# Patient Record
Sex: MALE | Race: WHITE | NOT HISPANIC OR LATINO
[De-identification: names, ages, dates, MRNs, and addresses within clinical notes are randomized per-mention and may not be internally consistent; named-entity substitution may affect disease eponyms.]

---

## 2017-03-15 ENCOUNTER — APPOINTMENT (OUTPATIENT)
Dept: CARDIOLOGY | Facility: CLINIC | Age: 72
End: 2017-03-15

## 2017-03-27 ENCOUNTER — APPOINTMENT (OUTPATIENT)
Dept: CARDIOLOGY | Facility: CLINIC | Age: 72
End: 2017-03-27

## 2017-04-06 ENCOUNTER — APPOINTMENT (OUTPATIENT)
Dept: CARDIOLOGY | Facility: CLINIC | Age: 72
End: 2017-04-06

## 2017-06-12 ENCOUNTER — APPOINTMENT (OUTPATIENT)
Dept: CARDIOLOGY | Facility: CLINIC | Age: 72
End: 2017-06-12

## 2017-07-18 ENCOUNTER — APPOINTMENT (OUTPATIENT)
Dept: CARDIOLOGY | Facility: CLINIC | Age: 72
End: 2017-07-18

## 2017-07-20 ENCOUNTER — APPOINTMENT (OUTPATIENT)
Dept: CARDIOLOGY | Facility: CLINIC | Age: 72
End: 2017-07-20

## 2017-07-24 ENCOUNTER — APPOINTMENT (OUTPATIENT)
Dept: CARDIOLOGY | Facility: CLINIC | Age: 72
End: 2017-07-24

## 2017-09-05 ENCOUNTER — APPOINTMENT (OUTPATIENT)
Dept: CARDIOLOGY | Facility: CLINIC | Age: 72
End: 2017-09-05
Payer: MEDICARE

## 2017-09-05 PROCEDURE — 99214 OFFICE O/P EST MOD 30 MIN: CPT

## 2017-11-21 ENCOUNTER — RX RENEWAL (OUTPATIENT)
Age: 72
End: 2017-11-21

## 2017-11-22 DIAGNOSIS — I07.1 RHEUMATIC TRICUSPID INSUFFICIENCY: ICD-10-CM

## 2017-11-28 ENCOUNTER — RX RENEWAL (OUTPATIENT)
Age: 72
End: 2017-11-28

## 2018-02-28 ENCOUNTER — RECORD ABSTRACTING (OUTPATIENT)
Age: 73
End: 2018-02-28

## 2018-02-28 DIAGNOSIS — I47.2 VENTRICULAR TACHYCARDIA: ICD-10-CM

## 2018-02-28 DIAGNOSIS — Q21.1 ATRIAL SEPTAL DEFECT: ICD-10-CM

## 2018-02-28 DIAGNOSIS — Z98.890 OTHER SPECIFIED POSTPROCEDURAL STATES: ICD-10-CM

## 2018-02-28 DIAGNOSIS — I49.1 ATRIAL PREMATURE DEPOLARIZATION: ICD-10-CM

## 2018-02-28 DIAGNOSIS — Z80.9 FAMILY HISTORY OF MALIGNANT NEOPLASM, UNSPECIFIED: ICD-10-CM

## 2018-02-28 DIAGNOSIS — Z86.11 PERSONAL HISTORY OF TUBERCULOSIS: ICD-10-CM

## 2018-02-28 DIAGNOSIS — Z86.73 PERSONAL HISTORY OF TRANSIENT ISCHEMIC ATTACK (TIA), AND CEREBRAL INFARCTION W/OUT RESIDUAL DEFICITS: ICD-10-CM

## 2018-03-06 ENCOUNTER — APPOINTMENT (OUTPATIENT)
Dept: CARDIOLOGY | Facility: CLINIC | Age: 73
End: 2018-03-06

## 2018-08-28 ENCOUNTER — APPOINTMENT (OUTPATIENT)
Dept: CARDIOLOGY | Facility: CLINIC | Age: 73
End: 2018-08-28
Payer: MEDICARE

## 2018-08-28 ENCOUNTER — NON-APPOINTMENT (OUTPATIENT)
Age: 73
End: 2018-08-28

## 2018-08-28 VITALS
WEIGHT: 164 LBS | BODY MASS INDEX: 24.86 KG/M2 | SYSTOLIC BLOOD PRESSURE: 98 MMHG | HEART RATE: 73 BPM | DIASTOLIC BLOOD PRESSURE: 64 MMHG | HEIGHT: 68 IN

## 2018-08-28 PROCEDURE — 93000 ELECTROCARDIOGRAM COMPLETE: CPT

## 2018-08-28 PROCEDURE — 99214 OFFICE O/P EST MOD 30 MIN: CPT

## 2018-08-28 RX ORDER — FINASTERIDE 5 MG/1
5 TABLET, FILM COATED ORAL DAILY
Refills: 0 | Status: ACTIVE | COMMUNITY

## 2018-08-28 RX ORDER — TAMSULOSIN HYDROCHLORIDE 0.4 MG/1
0.4 CAPSULE ORAL DAILY
Refills: 0 | Status: ACTIVE | COMMUNITY

## 2018-08-28 RX ORDER — ASPIRIN 81 MG
81 TABLET, DELAYED RELEASE (ENTERIC COATED) ORAL DAILY
Refills: 0 | Status: ACTIVE | COMMUNITY

## 2018-08-28 RX ORDER — TADALAFIL 5 MG/1
5 TABLET, FILM COATED ORAL
Refills: 0 | Status: ACTIVE | COMMUNITY

## 2018-10-11 ENCOUNTER — APPOINTMENT (OUTPATIENT)
Dept: CARDIOLOGY | Facility: CLINIC | Age: 73
End: 2018-10-11
Payer: MEDICARE

## 2018-10-11 PROCEDURE — 93306 TTE W/DOPPLER COMPLETE: CPT

## 2018-10-23 ENCOUNTER — APPOINTMENT (OUTPATIENT)
Dept: CARDIOLOGY | Facility: CLINIC | Age: 73
End: 2018-10-23
Payer: MEDICARE

## 2018-10-23 VITALS
SYSTOLIC BLOOD PRESSURE: 104 MMHG | HEART RATE: 80 BPM | DIASTOLIC BLOOD PRESSURE: 70 MMHG | BODY MASS INDEX: 24.25 KG/M2 | HEIGHT: 68 IN | WEIGHT: 160 LBS

## 2018-10-23 DIAGNOSIS — R06.09 OTHER FORMS OF DYSPNEA: ICD-10-CM

## 2018-10-23 PROCEDURE — 99214 OFFICE O/P EST MOD 30 MIN: CPT

## 2019-04-30 ENCOUNTER — RX RENEWAL (OUTPATIENT)
Age: 74
End: 2019-04-30

## 2019-08-06 ENCOUNTER — APPOINTMENT (OUTPATIENT)
Dept: CARDIOLOGY | Facility: CLINIC | Age: 74
End: 2019-08-06
Payer: MEDICARE

## 2019-08-06 ENCOUNTER — NON-APPOINTMENT (OUTPATIENT)
Age: 74
End: 2019-08-06

## 2019-08-06 VITALS
BODY MASS INDEX: 23.7 KG/M2 | HEIGHT: 69 IN | SYSTOLIC BLOOD PRESSURE: 110 MMHG | HEART RATE: 76 BPM | WEIGHT: 160 LBS | DIASTOLIC BLOOD PRESSURE: 60 MMHG | OXYGEN SATURATION: 98 %

## 2019-08-06 PROCEDURE — 93000 ELECTROCARDIOGRAM COMPLETE: CPT

## 2019-08-06 PROCEDURE — 99214 OFFICE O/P EST MOD 30 MIN: CPT

## 2019-08-06 NOTE — PHYSICAL EXAM
[General Appearance - Well Developed] : well developed [Normal Appearance] : normal appearance [General Appearance - Well Nourished] : well nourished [Well Groomed] : well groomed [General Appearance - In No Acute Distress] : no acute distress [No Deformities] : no deformities [Eyelids - No Xanthelasma] : the eyelids demonstrated no xanthelasmas [Normal Conjunctiva] : the conjunctiva exhibited no abnormalities [No Oral Pallor] : no oral pallor [Normal Oral Mucosa] : normal oral mucosa [No Oral Cyanosis] : no oral cyanosis [Normal Jugular Venous A Waves Present] : normal jugular venous A waves present [Normal Jugular Venous V Waves Present] : normal jugular venous V waves present [No Jugular Venous Crowder A Waves] : no jugular venous crowder A waves [Respiration, Rhythm And Depth] : normal respiratory rhythm and effort [Exaggerated Use Of Accessory Muscles For Inspiration] : no accessory muscle use [Heart Rate And Rhythm] : heart rate and rhythm were normal [Auscultation Breath Sounds / Voice Sounds] : lungs were clear to auscultation bilaterally [Murmurs] : no murmurs present [Heart Sounds] : normal S1 and S2 [Abdomen Soft] : soft [Abdomen Tenderness] : non-tender [Abdomen Mass (___ Cm)] : no abdominal mass palpated [Abnormal Walk] : normal gait [Gait - Sufficient For Exercise Testing] : the gait was sufficient for exercise testing [Cyanosis, Localized] : no localized cyanosis [Nail Clubbing] : no clubbing of the fingernails [Skin Color & Pigmentation] : normal skin color and pigmentation [Petechial Hemorrhages (___cm)] : no petechial hemorrhages [] : no rash [Skin Lesions] : no skin lesions [No Skin Ulcers] : no skin ulcer [No Venous Stasis] : no venous stasis [Oriented To Time, Place, And Person] : oriented to person, place, and time [No Xanthoma] : no  xanthoma was observed [Mood] : the mood was normal [Affect] : the affect was normal [No Anxiety] : not feeling anxious

## 2019-08-06 NOTE — REASON FOR VISIT
[Follow-Up - Clinic] : a clinic follow-up of [Abnormal ECG] : an abnormal ECG [Anticoagulation] : anticoagulation [Atrial Fibrillation] : atrial fibrillation [Dyspnea] : dyspnea [Cardiomyopathy] : cardiomyopathy [Medication Management] : Medication management [Hyperlipidemia] : hyperlipidemia

## 2019-08-08 NOTE — ASSESSMENT
[FreeTextEntry1] : \par PAF multiple DCCV s/p multaq and rfa, clinical improvement\par Cardiomyopathy\par Normal coronaries by cath '14\par PAD\par TIA, PFO on NOAC \par Left bundle-branch block.\par NSVT\par Baseline low blood pressure. \par Moderate valvular heart disease. \par \par Monitor valve disease and left ventricular function. \par Blood work has been requested. \par \par Continue anticoagulation with Eliquis.   Bleeding precautions reviewed. Monitor hemoglobin and renal function.\par Continue statin\par \par Reviewed red flag symptoms such as sustained palpitations and syncope, which would warrant urgent re-evaluation. \par \par \par

## 2019-08-08 NOTE — HISTORY OF PRESENT ILLNESS
[FreeTextEntry1] : ISSAC QUINONEZ  is a 74 year old  M\par \par with history of recurrent paroxysmal atrial fibrillation (fails corvert) s/p multiple DCCV, Multaq then RFA, NSVT, LBBB, normal coronaries, mildly reduced LVEF without sx CHF, VHD, remote TIA, PFO, tuberculosis status post treatment, borderline hypertension, minor atherosclerosis, basline low BP, PAD  and dyslipidemia.\par Prior open repair of an iliac aneurysm.\par Prior diagnosis of AF.  Initially on Coumadin due to Rifampin (h/o Tb).   Changed from Coumadin to Eliquis. \par There is no prior history of a clinical myocardial infarction, coronary revascularization. \par There is no history of symptomatic congestive heart failure \par \par Increased episodes of atrial fibrillation despite treatment with antiarrhythmic therapy. \par Underwent radiofrequency ablation.  \par Cardiac MRI performed at [facility]. No evidence of intrinsic myocardial disease.\par \par There is no exertional chest pain, pressure or discomfort. \par There is no significant dyspnea on exertion or orthopnea. \par There are no symptomatic palpitations, lightheadedness, dizziness or syncope.\par \par Blood work, hemoglobin 14.0, creatinine 0.8, LDL 90, total cholesterol 160 BNP 32 TSH 1.7. \par Last echocardiogram demonstrated ejection fraction of 50-55%. Moderate aortic regurgitation. Mild mitral regurgitation. \par EKG demonstrates normal sinus rhythm with APC and left bundle branch block\par \par MRI 5.17. [facility]. Mildly decreased LV systolic function. Normal RV systolic function. Normal atrial size. Mild aortic regurgitation moderate tricuspid regurgitation thoracic aorta 4.1x4.0 cm. No evidence of infarction inflammation or focal fibrosis. Ejection fraction 45%. Motion pattern changes of left under branch block. Moderate tricuspid regurgitation\par \par

## 2019-09-18 ENCOUNTER — APPOINTMENT (OUTPATIENT)
Dept: CARDIOLOGY | Facility: CLINIC | Age: 74
End: 2019-09-18
Payer: MEDICARE

## 2019-09-18 PROCEDURE — 93306 TTE W/DOPPLER COMPLETE: CPT

## 2019-09-18 PROCEDURE — 93880 EXTRACRANIAL BILAT STUDY: CPT

## 2019-09-18 PROCEDURE — 93979 VASCULAR STUDY: CPT

## 2019-09-19 ENCOUNTER — RX RENEWAL (OUTPATIENT)
Age: 74
End: 2019-09-19

## 2019-09-19 ENCOUNTER — APPOINTMENT (OUTPATIENT)
Dept: CARDIOLOGY | Facility: CLINIC | Age: 74
End: 2019-09-19
Payer: MEDICARE

## 2019-09-19 VITALS
DIASTOLIC BLOOD PRESSURE: 70 MMHG | HEART RATE: 77 BPM | HEIGHT: 69 IN | BODY MASS INDEX: 22.96 KG/M2 | OXYGEN SATURATION: 98 % | WEIGHT: 155 LBS | SYSTOLIC BLOOD PRESSURE: 116 MMHG

## 2019-09-19 PROCEDURE — 99215 OFFICE O/P EST HI 40 MIN: CPT

## 2019-09-19 NOTE — PHYSICAL EXAM
[General Appearance - Well Developed] : well developed [Normal Appearance] : normal appearance [Well Groomed] : well groomed [General Appearance - Well Nourished] : well nourished [No Deformities] : no deformities [General Appearance - In No Acute Distress] : no acute distress [Normal Conjunctiva] : the conjunctiva exhibited no abnormalities [Eyelids - No Xanthelasma] : the eyelids demonstrated no xanthelasmas [Normal Oral Mucosa] : normal oral mucosa [No Oral Cyanosis] : no oral cyanosis [No Oral Pallor] : no oral pallor [Normal Jugular Venous A Waves Present] : normal jugular venous A waves present [Normal Jugular Venous V Waves Present] : normal jugular venous V waves present [No Jugular Venous Crowder A Waves] : no jugular venous crowder A waves [Respiration, Rhythm And Depth] : normal respiratory rhythm and effort [Exaggerated Use Of Accessory Muscles For Inspiration] : no accessory muscle use [Auscultation Breath Sounds / Voice Sounds] : lungs were clear to auscultation bilaterally [Heart Rate And Rhythm] : heart rate and rhythm were normal [Heart Sounds] : normal S1 and S2 [Murmurs] : no murmurs present [Abdomen Mass (___ Cm)] : no abdominal mass palpated [Abnormal Walk] : normal gait [Gait - Sufficient For Exercise Testing] : the gait was sufficient for exercise testing [Nail Clubbing] : no clubbing of the fingernails [Cyanosis, Localized] : no localized cyanosis [Petechial Hemorrhages (___cm)] : no petechial hemorrhages [Skin Color & Pigmentation] : normal skin color and pigmentation [] : no rash [No Venous Stasis] : no venous stasis [Skin Lesions] : no skin lesions [No Skin Ulcers] : no skin ulcer [No Xanthoma] : no  xanthoma was observed [Affect] : the affect was normal [Oriented To Time, Place, And Person] : oriented to person, place, and time [Mood] : the mood was normal [No Anxiety] : not feeling anxious

## 2019-09-19 NOTE — HISTORY OF PRESENT ILLNESS
[FreeTextEntry1] : ISSAC QUINONEZ  is a 74 year old  M\par \par with history of recurrent paroxysmal atrial fibrillation (fails corvert) s/p multiple DCCV, Multaq then RFA, NSVT, LBBB, normal coronaries, mildly reduced LVEF without sx CHF, VHD, remote TIA, PFO, tuberculosis status post treatment, borderline hypertension, minor atherosclerosis, baseline low BP, PAD  and dyslipidemia.\par Prior open repair of an iliac aneurysm.\par Prior diagnosis of AF.  Initially on Coumadin due to Rifampin (h/o Tb).   Changed from Coumadin to Eliquis. \par There is no prior history of a clinical myocardial infarction, coronary revascularization. \par There is no history of symptomatic congestive heart failure \par \par Increased episodes of atrial fibrillation despite treatment with antiarrhythmic therapy. \par Underwent radiofrequency ablation.  \par Cardiac MRI performed at [facility]. No evidence of intrinsic myocardial disease.\par \par There is no exertional chest pain, pressure or discomfort. \par There is no significant dyspnea on exertion or orthopnea. \par There are no symptomatic palpitations, lightheadedness, dizziness or syncope.\par \par Patient returns to office with echocardiogram performed on 9/18/2019. EF was read <30% (decreased from previous echocardiogram), moderate MR, moderate AI. \par \par Blood work, hemoglobin 14.0, creatinine 0.8, LDL 90, total cholesterol 160 BNP 32 TSH 1.7. \par Last echocardiogram demonstrated ejection fraction of 50-55%. Moderate aortic regurgitation. Mild mitral regurgitation. \par EKG demonstrates normal sinus rhythm with APC and left bundle branch block\par \par MRI 5.17. [facility]. Mildly decreased LV systolic function. Normal RV systolic function. Normal atrial size. Mild aortic regurgitation moderate tricuspid regurgitation thoracic aorta 4.1x4.0 cm. No evidence of infarction inflammation or focal fibrosis. Ejection fraction 45%. Motion pattern changes of left under branch block. Moderate tricuspid regurgitation\par \par

## 2019-09-19 NOTE — REASON FOR VISIT
[Follow-Up - Clinic] : a clinic follow-up of [Abnormal ECG] : an abnormal ECG [Anticoagulation] : anticoagulation [Atrial Fibrillation] : atrial fibrillation [Cardiomyopathy] : cardiomyopathy [Dyspnea] : dyspnea [Hyperlipidemia] : hyperlipidemia [Medication Management] : Medication management [Spouse] : spouse

## 2019-09-19 NOTE — ASSESSMENT
[FreeTextEntry1] : \par PAF multiple DCCV s/p multaq and rfa, clinical improvement\par Cardiomyopathy (decreased in EF)\par Normal coronaries by cath '14\par PAD\par TIA, PFO on NOAC \par Left bundle-branch block.\par NSVT\par Baseline low blood pressure. \par Moderate valvular heart disease. \par \par Monitor valve disease and left ventricular function. \par Blood work has been requested. \par \par Continue anticoagulation with Eliquis (high risk medication with no signs of toxicity).   Bleeding precautions reviewed. Monitor hemoglobin and renal function.\par Continue statin\par \par Discussed findings of echo with patient and wife. Because of decline in EF recommend 30 Day event monitor to rule out rapid asymptomatic undetected paroxysmal rapid a-fib, suggest sleep study. Will start Lisinopril 2.5mg daily and Toprol XL 25mg daily. These medications should be titrated up to maximally tolerated doses with repeat echo in 3-6 months.\par \par \par

## 2019-10-07 ENCOUNTER — APPOINTMENT (OUTPATIENT)
Dept: CARDIOLOGY | Facility: CLINIC | Age: 74
End: 2019-10-07
Payer: MEDICARE

## 2019-10-07 VITALS
HEART RATE: 72 BPM | BODY MASS INDEX: 22.81 KG/M2 | HEIGHT: 69 IN | SYSTOLIC BLOOD PRESSURE: 100 MMHG | DIASTOLIC BLOOD PRESSURE: 62 MMHG | OXYGEN SATURATION: 97 % | WEIGHT: 154 LBS

## 2019-10-07 DIAGNOSIS — E04.1 NONTOXIC SINGLE THYROID NODULE: ICD-10-CM

## 2019-10-07 DIAGNOSIS — Z00.00 ENCOUNTER FOR GENERAL ADULT MEDICAL EXAMINATION W/OUT ABNORMAL FINDINGS: ICD-10-CM

## 2019-10-07 PROCEDURE — 99215 OFFICE O/P EST HI 40 MIN: CPT

## 2019-10-07 NOTE — ASSESSMENT
[FreeTextEntry1] : \par PAF multiple DCCV s/p multaq and rfa\par Cardiomyopathy, now severe\par PAD\par TIA, PFO on NOAC \par Left bundle-branch block.\par NSVT\par Baseline low blood pressure. \par Moderate valvular heart disease. \par Atherosclerosis\par Thyroid cyst?\par \par Recommend cardiac catheterization to rule out ischemic etiology. Evaluate filling pressures. 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 \par Hold anticoagulation prior to procedure. \par Continue beta blocker and ACE inhibitor. \par Potentially involve cardiomyopathy specialist if monitor and catheterization unremarkable. Note previously he underwent a cardiac MRI\par \par Discussed the risks, benefits, alternatives of invasive angiography.\par Discussed potential for revascularization, percutaneous v surgical\par All questions answered.\par RHC/LHC, radial access\par If escalating sx or sx at rest then 911\par \par Followup sleep study. Followup event monitor.\par \par Address device if EF remains <35 despite GDMT\par Continue anticoagulation with Eliquis.   Bleeding precautions reviewed. Monitor hemoglobin and renal function.\par Continue statin\par Thyroid ultrasound has been requested\par \par Reviewed red flag symptoms such as sustained palpitations and syncope, which would warrant urgent re-evaluation.

## 2019-10-07 NOTE — REASON FOR VISIT
[Follow-Up - Clinic] : a clinic follow-up of [Abnormal ECG] : an abnormal ECG [Anticoagulation] : anticoagulation [Cardiomyopathy] : cardiomyopathy [Atrial Fibrillation] : atrial fibrillation [Medication Management] : Medication management [Dyspnea] : dyspnea [Hyperlipidemia] : hyperlipidemia

## 2019-10-07 NOTE — HISTORY OF PRESENT ILLNESS
[FreeTextEntry1] : ISSAC QUINONEZ  is a 74 year old  M\par \par \par with history of recurrent paroxysmal atrial fibrillation (fails corvert) s/p multiple DCCV, Multaq then RFA, NSVT, LBBB, normal coronaries, mildly reduced LVEF without sx CHF, VHD, remote TIA, PFO, tuberculosis status post treatment, borderline hypertension, minor atherosclerosis, basline low BP, PAD  and dyslipidemia.\par Prior open repair of an iliac aneurysm.\par Prior diagnosis of AF.  Initially on Coumadin due to Rifampin (h/o Tb).   Changed from Coumadin to Eliquis. \par There is no prior history of a clinical myocardial infarction, coronary revascularization. \par There is no history of symptomatic congestive heart failure \par \par Increased episodes of atrial fibrillation despite treatment with antiarrhythmic therapy. \par Underwent radiofrequency ablation.  \par Cardiac MRI performed at [facility]. No evidence of intrinsic myocardial disease.\par \par There is no exertional chest pain, pressure or discomfort. \par There is no significant dyspnea on exertion or orthopnea. \par There are no symptomatic palpitations, lightheadedness, dizziness or syncope.\par \par September 2019, hemoglobin 14.8, creatinine 1.0, LDL 74, total cholesterol 147 A1c 5.9. BMP 75, TSH 1.8. \par Abdominal ultrasound no aneurysm mild plaque \par Echocardiogram reduced LV function moderate mitral regurgitation, moderate aortic regurgitation. Aorta 4.1 cm \par Carotid Doppler, mild, nonobstructive disease, right thyroid cyst\par \par After echocardiogram. He was seen in the office and an event monitor was performed. \par He was started on lisinopril and metoprolol his blood pressure is 100s/60s\par He is physically active. He works with his  walks and does yoga. \par There is minor dependent peripheral edema. \par \par \par Blood work, hemoglobin 14.0, creatinine 0.8, LDL 90, total cholesterol 160 BNP 32 TSH 1.7. \par Last echocardiogram demonstrated ejection fraction of 50-55%. Moderate aortic regurgitation. Mild mitral regurgitation. \par EKG demonstrates normal sinus rhythm with APC and left bundle branch block\par \par MRI 5.17. [facility]. Mildly decreased LV systolic function. Normal RV systolic function. Normal atrial size. Mild aortic regurgitation moderate tricuspid regurgitation thoracic aorta 4.1x4.0 cm. No evidence of infarction inflammation or focal fibrosis. Ejection fraction 45%. Motion pattern changes of left under branch block. Moderate tricuspid regurgitation\par \par \par

## 2019-10-07 NOTE — PHYSICAL EXAM
[Well Groomed] : well groomed [General Appearance - Well Developed] : well developed [Normal Appearance] : normal appearance [No Deformities] : no deformities [General Appearance - Well Nourished] : well nourished [Eyelids - No Xanthelasma] : the eyelids demonstrated no xanthelasmas [General Appearance - In No Acute Distress] : no acute distress [Normal Conjunctiva] : the conjunctiva exhibited no abnormalities [Normal Oral Mucosa] : normal oral mucosa [No Oral Pallor] : no oral pallor [No Oral Cyanosis] : no oral cyanosis [Normal Jugular Venous A Waves Present] : normal jugular venous A waves present [Normal Jugular Venous V Waves Present] : normal jugular venous V waves present [No Jugular Venous Crowder A Waves] : no jugular venous crowder A waves [Exaggerated Use Of Accessory Muscles For Inspiration] : no accessory muscle use [Respiration, Rhythm And Depth] : normal respiratory rhythm and effort [Heart Rate And Rhythm] : heart rate and rhythm were normal [Auscultation Breath Sounds / Voice Sounds] : lungs were clear to auscultation bilaterally [Heart Sounds] : normal S1 and S2 [Murmurs] : no murmurs present [Abdomen Soft] : soft [Abdomen Tenderness] : non-tender [Abnormal Walk] : normal gait [Abdomen Mass (___ Cm)] : no abdominal mass palpated [Gait - Sufficient For Exercise Testing] : the gait was sufficient for exercise testing [Nail Clubbing] : no clubbing of the fingernails [Cyanosis, Localized] : no localized cyanosis [Petechial Hemorrhages (___cm)] : no petechial hemorrhages [Skin Color & Pigmentation] : normal skin color and pigmentation [No Venous Stasis] : no venous stasis [Skin Lesions] : no skin lesions [] : no rash [No Xanthoma] : no  xanthoma was observed [No Skin Ulcers] : no skin ulcer [Oriented To Time, Place, And Person] : oriented to person, place, and time [Affect] : the affect was normal [Mood] : the mood was normal [No Anxiety] : not feeling anxious

## 2019-10-09 ENCOUNTER — OUTPATIENT (OUTPATIENT)
Dept: OUTPATIENT SERVICES | Facility: HOSPITAL | Age: 74
LOS: 1 days | End: 2019-10-09
Payer: MEDICARE

## 2019-10-09 PROCEDURE — 93460 R&L HRT ART/VENTRICLE ANGIO: CPT | Mod: 26

## 2019-10-09 PROCEDURE — 93010 ELECTROCARDIOGRAM REPORT: CPT

## 2019-10-11 ENCOUNTER — APPOINTMENT (OUTPATIENT)
Dept: CARDIOLOGY | Facility: CLINIC | Age: 74
End: 2019-10-11
Payer: MEDICARE

## 2019-10-11 VITALS
OXYGEN SATURATION: 99 % | SYSTOLIC BLOOD PRESSURE: 90 MMHG | BODY MASS INDEX: 23.64 KG/M2 | DIASTOLIC BLOOD PRESSURE: 56 MMHG | WEIGHT: 156 LBS | HEIGHT: 68 IN | HEART RATE: 77 BPM

## 2019-10-11 PROCEDURE — 99214 OFFICE O/P EST MOD 30 MIN: CPT

## 2019-10-14 ENCOUNTER — RX RENEWAL (OUTPATIENT)
Age: 74
End: 2019-10-14

## 2019-11-07 ENCOUNTER — APPOINTMENT (OUTPATIENT)
Dept: CARDIOLOGY | Facility: CLINIC | Age: 74
End: 2019-11-07
Payer: MEDICARE

## 2019-11-07 ENCOUNTER — NON-APPOINTMENT (OUTPATIENT)
Age: 74
End: 2019-11-07

## 2019-11-07 ENCOUNTER — RECORD ABSTRACTING (OUTPATIENT)
Age: 74
End: 2019-11-07

## 2019-11-07 VITALS
HEIGHT: 68 IN | BODY MASS INDEX: 23.34 KG/M2 | WEIGHT: 154 LBS | OXYGEN SATURATION: 98 % | DIASTOLIC BLOOD PRESSURE: 60 MMHG | HEART RATE: 80 BPM | SYSTOLIC BLOOD PRESSURE: 100 MMHG

## 2019-11-07 DIAGNOSIS — I36.1 NONRHEUMATIC TRICUSPID (VALVE) INSUFFICIENCY: ICD-10-CM

## 2019-11-07 PROCEDURE — 93000 ELECTROCARDIOGRAM COMPLETE: CPT

## 2019-11-07 NOTE — ASSESSMENT
[FreeTextEntry1] : ISSAC QUINONEZ  is a 74 year M  who presents today Nov 07, 2019 with the above history and the following active issues. \par \par PAF multiple DCCV s/p multaq and rfa\par Cardiomyopathy, now severe\par PAD\par TIA, PFO on NOAC \par Left bundle-branch block.\par NSVT\par Baseline low blood pressure. \par Moderate valvular heart disease. \par Atherosclerosis\par \par Continue beta blocker and ACE inhibitor. Separate doses (one in AM and one in PM). If lightheadedness persists then consider discontinuing ACE inhibitor. \par Potentially involve cardiomyopathy specialist if monitor and catheterization unremarkable. Note previously he underwent a cardiac MRI\par \par Followup sleep study. \par \par Address device if EF remains <35 despite GDMT\par Continue anticoagulation with Eliquis.   Bleeding precautions reviewed. Monitor hemoglobin and renal function.\par Continue statin\par Thyroid ultrasound results have been requested\par \par Reviewed red flag symptoms such as sustained palpitations and syncope, which would warrant urgent re-evaluation. \par \par Sincerely,\par \par Tanya Rockwell PA-C\par Patients history, testing and plan reviewed with supervising MD: Dr. Patrick Valentino\par and over the phone with patients cardiologist - Dr. Bueno

## 2019-11-07 NOTE — PHYSICAL EXAM
[Normal Appearance] : normal appearance [General Appearance - Well Developed] : well developed [General Appearance - Well Nourished] : well nourished [Well Groomed] : well groomed [No Deformities] : no deformities [General Appearance - In No Acute Distress] : no acute distress [Normal Conjunctiva] : the conjunctiva exhibited no abnormalities [Eyelids - No Xanthelasma] : the eyelids demonstrated no xanthelasmas [Normal Oral Mucosa] : normal oral mucosa [No Oral Pallor] : no oral pallor [No Oral Cyanosis] : no oral cyanosis [Normal Jugular Venous A Waves Present] : normal jugular venous A waves present [Normal Jugular Venous V Waves Present] : normal jugular venous V waves present [No Jugular Venous Crowder A Waves] : no jugular venous crowder A waves [Respiration, Rhythm And Depth] : normal respiratory rhythm and effort [Exaggerated Use Of Accessory Muscles For Inspiration] : no accessory muscle use [Auscultation Breath Sounds / Voice Sounds] : lungs were clear to auscultation bilaterally [Heart Sounds] : normal S1 and S2 [Heart Rate And Rhythm] : heart rate and rhythm were normal [Abdomen Soft] : soft [Murmurs] : no murmurs present [Abdomen Tenderness] : non-tender [Abdomen Mass (___ Cm)] : no abdominal mass palpated [Abnormal Walk] : normal gait [Gait - Sufficient For Exercise Testing] : the gait was sufficient for exercise testing [Nail Clubbing] : no clubbing of the fingernails [Cyanosis, Localized] : no localized cyanosis [Petechial Hemorrhages (___cm)] : no petechial hemorrhages [Skin Color & Pigmentation] : normal skin color and pigmentation [No Venous Stasis] : no venous stasis [] : no rash [Skin Lesions] : no skin lesions [No Skin Ulcers] : no skin ulcer [No Xanthoma] : no  xanthoma was observed [Oriented To Time, Place, And Person] : oriented to person, place, and time [Mood] : the mood was normal [Affect] : the affect was normal [No Anxiety] : not feeling anxious

## 2019-11-07 NOTE — HISTORY OF PRESENT ILLNESS
[FreeTextEntry1] : ISSAC QUINONEZ  is a 74 year M  who presents today Nov 07, 2019 in clinical follow-up. He called the office yesterday stating that he was having lightheadedness and was advised to come to the office for follow-up. Just commpleted 30 day event recorder yesterday. Preliminary results Avg HR 71bpm, low HR 50bpm and high HR 147bpm. Short burst of SVT - reviewed with Dr. Valentino. Taking Lisinopril and Metoprolol at night. \par There were 2 episodes of lightheadedness with high intensity exercise. Self resolving with rest. Denies dehydration. \par Status post cardiac cath with Dr. Nelson which demonstrated normal coronary arteries. \par \par History of recurrent paroxysmal atrial fibrillation (fails corvert) s/p multiple DCCV, Multaq then RFA, NSVT, LBBB, normal coronaries, mildly reduced LVEF without sx CHF, VHD, remote TIA, PFO, tuberculosis status post treatment, borderline hypertension, minor atherosclerosis, basline low BP, PAD  and dyslipidemia.\par Prior open repair of an iliac aneurysm.\par Prior diagnosis of AF.  Initially on Coumadin due to Rifampin (h/o Tb).   Changed from Coumadin to Eliquis. \par There is no prior history of a clinical myocardial infarction, coronary revascularization. \par There is no history of symptomatic congestive heart failure \par \par Increased episodes of atrial fibrillation despite treatment with antiarrhythmic therapy. \par Underwent radiofrequency ablation.  \par Cardiac MRI performed at [facility]. No evidence of intrinsic myocardial disease.\par \par There is no exertional chest pain, pressure or discomfort. \par There is no significant dyspnea on exertion or orthopnea. \par There are no symptomatic palpitations, lightheadedness, dizziness or syncope.\par \par September 2019, hemoglobin 14.8, creatinine 1.0, LDL 74, total cholesterol 147 A1c 5.9. BMP 75, TSH 1.8. \par Abdominal ultrasound no aneurysm mild plaque \par Echocardiogram 9/19 EF <30% reduced LV function moderate mitral regurgitation, moderate aortic regurgitation. Aorta 4.1 cm \par Carotid Doppler, mild, nonobstructive disease, right thyroid cyst\par \par Blood work, hemoglobin 14.0, creatinine 0.8, LDL 90, total cholesterol 160 BNP 32 TSH 1.7. \par Last echocardiogram demonstrated ejection fraction of 50-55%. Moderate aortic regurgitation. Mild mitral regurgitation. \par EKG demonstrates normal sinus rhythm with APC and left bundle branch block\par \par MRI 5.17. [facility]. Mildly decreased LV systolic function. Normal RV systolic function. Normal atrial size. Mild aortic regurgitation moderate tricuspid regurgitation thoracic aorta 4.1x4.0 cm. No evidence of infarction inflammation or focal fibrosis. Ejection fraction 45%. Motion pattern changes of left under branch block. Moderate tricuspid regurgitation\par \par \par

## 2019-12-12 ENCOUNTER — APPOINTMENT (OUTPATIENT)
Dept: CARDIOLOGY | Facility: CLINIC | Age: 74
End: 2019-12-12
Payer: MEDICARE

## 2019-12-12 PROCEDURE — 93306 TTE W/DOPPLER COMPLETE: CPT

## 2019-12-19 ENCOUNTER — RECORD ABSTRACTING (OUTPATIENT)
Age: 74
End: 2019-12-19

## 2019-12-23 ENCOUNTER — APPOINTMENT (OUTPATIENT)
Dept: CARDIOLOGY | Facility: CLINIC | Age: 74
End: 2019-12-23
Payer: MEDICARE

## 2019-12-23 VITALS
OXYGEN SATURATION: 97 % | HEART RATE: 84 BPM | BODY MASS INDEX: 23.64 KG/M2 | DIASTOLIC BLOOD PRESSURE: 60 MMHG | SYSTOLIC BLOOD PRESSURE: 100 MMHG | WEIGHT: 156 LBS | HEIGHT: 68 IN

## 2019-12-23 PROCEDURE — 99214 OFFICE O/P EST MOD 30 MIN: CPT

## 2019-12-23 NOTE — HISTORY OF PRESENT ILLNESS
[FreeTextEntry1] : 74 year old male with a PMH of parox AF s/p multiple DCCV, Multaq then RFA, NSVT, LBBB, mildly reduced EF without sx, CHF, VHD, remote TIA, PFO, TB s/p treatment, HTN, minor atherosclerosis, PAD, dyslipidemia. Prior open repair of iliac aneurysm. He presented to the office 11/7/19 with complaints of dizziness. It was recommended to separate doses of beta blocker and ace in AM and PM.\par \par He presents today for follow up to review recent echo and states dizziness has vastly improved. Denies CP, SOB, palpitations, dizziness, lightheadedness, syncope, near syncope, PND, orthopnea, and claudication. He is exercising frequently doing HIIT and yoga multiple times a week without exertional complaints.\par \par Testing:\par \par \par Echo 12/12/19: EF 55%, MVP, mod MR, mod AR, septal motion c/w LBBB. mild to mod TR, mild AR.\par Reviewed with RP who states EF around 40-45 %, septal inferior wall motion abnormalities.\par MCOT 10/2019: Avg HR 71bpm, low HR 50bpm and high HR 147bpm. Short burst of SVT, longest being 17 beats in duration. 10 seconds of AT/AF\par \par Cardiac cath 10/2019: Normal Cors\par \par September 2019, hemoglobin 14.8, creatinine 1.0, LDL 74, total cholesterol 147 A1c 5.9. BMP 75, TSH 1.8. \par \par \par Abd u/s 9/18/19: No evidence of aortic aneurysm, mild plaque.\par \par Echocardiogram 9/19 EF <30% reduced LV function moderate mitral regurgitation, moderate aortic regurgitation. Aorta 4.1 cm \par \par Carotid Doppler 9/18/19, mild, nonobstructive disease, right thyroid cyst. Dedicated thyroid u/s 10/10/19 revealed 2 nodules <1.5 mm and a medial thyroid cyst. Results were discussed with patient and sent to PCP.\par \par MRI 5.17. [facility]. Mildly decreased LV systolic function. Normal RV systolic function. Normal atrial size. Mild aortic regurgitation moderate tricuspid regurgitation thoracic aorta 4.1x4.0 cm. No evidence of infarction inflammation or focal fibrosis. Ejection fraction 45%. Motion pattern changes of left under branch block. Moderate tricuspid regurgitation\par \par \par \par

## 2019-12-23 NOTE — PHYSICAL EXAM
[General Appearance - Well Nourished] : well nourished [Well Groomed] : well groomed [Normal Appearance] : normal appearance [General Appearance - Well Developed] : well developed [No Deformities] : no deformities [General Appearance - In No Acute Distress] : no acute distress

## 2019-12-23 NOTE — ASSESSMENT
[FreeTextEntry1] : ISSAC QUINONEZ is a 74 year M who presents today 12/23/19 with the above history and the following active issues. \par \par Dizziness improved. Feels well and has no complaints today.\par \par Follow up sleep study.\par \par Parox AF/NSVT/LBBB: Continue Eliquis and betablocker.  Will do follow up Zio patch in 2 months.  Bleeding precautions reviewed. Monitor hemoglobin and renal function.\par \par CM: EF improving. Continue Ace.\par \par TIA/PFO: Continue Eliquis, ASA, and statin.\par \par VHD: Stable. BP well controlled.\par \par PAD/Dyslipidemia/atherosclerosis: Continue statin and ASA.\par \par Ongoing follow up with PCP.\par \par Follow up in 3 months and review Zio results.\par \par Sincerely,\par Madelin PRITCHETTP-BC\par Supervising physician, Dr. Rojas\par

## 2020-02-24 ENCOUNTER — APPOINTMENT (OUTPATIENT)
Dept: CARDIOLOGY | Facility: CLINIC | Age: 75
End: 2020-02-24
Payer: MEDICARE

## 2020-02-24 PROCEDURE — 0296T: CPT

## 2020-03-09 PROCEDURE — 0298T: CPT

## 2020-03-24 ENCOUNTER — APPOINTMENT (OUTPATIENT)
Dept: CARDIOLOGY | Facility: CLINIC | Age: 75
End: 2020-03-24

## 2020-07-21 ENCOUNTER — APPOINTMENT (OUTPATIENT)
Dept: CARDIOLOGY | Facility: CLINIC | Age: 75
End: 2020-07-21
Payer: MEDICARE

## 2020-07-21 VITALS
SYSTOLIC BLOOD PRESSURE: 102 MMHG | WEIGHT: 155 LBS | BODY MASS INDEX: 23.49 KG/M2 | DIASTOLIC BLOOD PRESSURE: 64 MMHG | OXYGEN SATURATION: 96 % | HEIGHT: 68 IN | HEART RATE: 67 BPM | TEMPERATURE: 98.5 F

## 2020-07-21 PROCEDURE — 99214 OFFICE O/P EST MOD 30 MIN: CPT

## 2020-07-21 NOTE — HISTORY OF PRESENT ILLNESS
[FreeTextEntry1] : ISSAC QUINONEZ  is a 75 year M  who presents today Jul 21, 2020 in clinical follow-up. Last seen in our office on December 23, 2020. ZIO monitor applied and was to return to office to review results. Overall feeling well. Exercising 6 times a week with no new exertional complaints. \par \par 75 year old male with a PMH of parox AF s/p multiple DCCV, Multaq then RFA, NSVT, LBBB, mildly reduced EF without sx, CHF, VHD, remote TIA, PFO, TB s/p treatment, HTN, minor atherosclerosis, PAD, dyslipidemia. Prior open repair of iliac aneurysm. \par \par Today he denies chest pain, pressure, unusual shortness of breath, lightheadedness, dizziness, near syncope or syncope. \par \par Testing:\par \par ZIO monitor 2/24/20 sinus rhythm with LBBB, short SVT, difficult to r/o PAF on AC, min HR 48, max  and avg HR 68bpm. \par \par Echo 12/12/19: EF 55%, MVP, mod MR, mod AR, septal motion c/w LBBB. mild to mod TR, mild VT.\par \par MCOT 10/2019: Avg HR 71bpm, low HR 50bpm and high HR 147bpm. Short burst of SVT, longest being 17 beats in duration. 10 seconds of AT/AF\par \par Cardiac cath 10/2019: Normal Cors\par \par September 2019, hemoglobin 14.8, creatinine 1.0, LDL 74, total cholesterol 147 A1c 5.9. BMP 75, TSH 1.8. \par \par Abd u/s 9/18/19: No evidence of aortic aneurysm, mild plaque.\par \par Echocardiogram 9/19 EF <30% reduced LV function moderate mitral regurgitation, moderate aortic regurgitation. Aorta 4.1 cm \par \par Carotid Doppler 9/18/19, mild, nonobstructive disease, right thyroid cyst. Dedicated thyroid u/s 10/10/19 revealed 2 nodules <1.5 mm and a medial thyroid cyst. Results were discussed with patient and sent to PCP.\par \par MRI 5.17. [facility]. Mildly decreased LV systolic function. Normal RV systolic function. Normal atrial size. Mild aortic regurgitation moderate tricuspid regurgitation thoracic aorta 4.1 x 4.0 cm. No evidence of infarction inflammation or focal fibrosis. Ejection fraction 45%. Motion pattern changes of left under branch block. Moderate tricuspid regurgitation\par \par \par \par

## 2020-07-21 NOTE — PHYSICAL EXAM
[Well Groomed] : well groomed [General Appearance - Well Developed] : well developed [Normal Appearance] : normal appearance [General Appearance - Well Nourished] : well nourished [No Deformities] : no deformities [General Appearance - In No Acute Distress] : no acute distress [Normal Conjunctiva] : the conjunctiva exhibited no abnormalities [Eyelids - No Xanthelasma] : the eyelids demonstrated no xanthelasmas [No Oral Pallor] : no oral pallor [No Oral Cyanosis] : no oral cyanosis [Normal Oral Mucosa] : normal oral mucosa [Normal Jugular Venous A Waves Present] : normal jugular venous A waves present [Normal Jugular Venous V Waves Present] : normal jugular venous V waves present [No Jugular Venous Crowder A Waves] : no jugular venous crowder A waves [Respiration, Rhythm And Depth] : normal respiratory rhythm and effort [Exaggerated Use Of Accessory Muscles For Inspiration] : no accessory muscle use [Auscultation Breath Sounds / Voice Sounds] : lungs were clear to auscultation bilaterally [Heart Sounds] : normal S1 and S2 [Heart Rate And Rhythm] : heart rate and rhythm were normal [Murmurs] : no murmurs present [Abdomen Soft] : soft [Abdomen Tenderness] : non-tender [Abnormal Walk] : normal gait [Abdomen Mass (___ Cm)] : no abdominal mass palpated [Cyanosis, Localized] : no localized cyanosis [Gait - Sufficient For Exercise Testing] : the gait was sufficient for exercise testing [Nail Clubbing] : no clubbing of the fingernails [Petechial Hemorrhages (___cm)] : no petechial hemorrhages [Skin Color & Pigmentation] : normal skin color and pigmentation [] : no rash [No Venous Stasis] : no venous stasis [Skin Lesions] : no skin lesions [No Skin Ulcers] : no skin ulcer [No Xanthoma] : no  xanthoma was observed [Oriented To Time, Place, And Person] : oriented to person, place, and time [Affect] : the affect was normal [Mood] : the mood was normal [No Anxiety] : not feeling anxious

## 2020-07-21 NOTE — ASSESSMENT
[FreeTextEntry1] : ISSAC QUINONEZ  is a 75 year M  who presents today Jul 21, 2020 with the above history and the following active issues. \par \par Paroxysmal  AF/NSVT/LBBB: Continue Eliquis and beta blocker.  ZIO monitor reviewed. Bleeding precautions reviewed on high risk medication with Eliquis. Follow-up blood work recommended. \par \par Cardiomyopathy: EF improved to 55%.  Continue Ace. Ventricular rates controlled by ZIO monitor. Continue to monitor.\par \par TIA/PFO: Continue Eliquis, ASA, and statin.\par \par VHD: Stable by echo December 2019. Does not require SBE prophylaxis. \par \par PAD/Dyslipidemia/atherosclerosis: Continue statin and ASA.\par \par Ongoing follow up with PCP.\par \par I have requested he contact me once blood work is performed and I will review the results over the phone. \par Clinical follow-up in 6 months with Dr. Bueno\par \par \par Sincerely,\par \par Tanya Rockwell PA-C\par Patients history, testing and plan reviewed with supervising MD: Dr. Clinton Bueno

## 2020-07-21 NOTE — ADDENDUM
[FreeTextEntry1] : Please note the patient was reviewed with DORA Rockwell.\par I was physically present during the service of the patient\par I was directly involved in the management plan and recommendations of care provided to the patient. \par I personally reviewed the history and physical exam and examination as documented by the PA above.\par 07/21/2020\par

## 2020-10-27 ENCOUNTER — RX RENEWAL (OUTPATIENT)
Age: 75
End: 2020-10-27

## 2021-01-29 ENCOUNTER — NON-APPOINTMENT (OUTPATIENT)
Age: 76
End: 2021-01-29

## 2021-01-29 ENCOUNTER — APPOINTMENT (OUTPATIENT)
Dept: CARDIOLOGY | Facility: CLINIC | Age: 76
End: 2021-01-29
Payer: MEDICARE

## 2021-01-29 VITALS
TEMPERATURE: 98 F | DIASTOLIC BLOOD PRESSURE: 60 MMHG | HEART RATE: 86 BPM | HEIGHT: 68 IN | OXYGEN SATURATION: 98 % | SYSTOLIC BLOOD PRESSURE: 100 MMHG | BODY MASS INDEX: 23.19 KG/M2 | WEIGHT: 153 LBS

## 2021-01-29 DIAGNOSIS — E78.5 HYPERLIPIDEMIA, UNSPECIFIED: ICD-10-CM

## 2021-01-29 PROCEDURE — 93000 ELECTROCARDIOGRAM COMPLETE: CPT

## 2021-01-29 PROCEDURE — 99214 OFFICE O/P EST MOD 30 MIN: CPT

## 2021-02-01 ENCOUNTER — APPOINTMENT (OUTPATIENT)
Dept: CARDIOLOGY | Facility: CLINIC | Age: 76
End: 2021-02-01

## 2021-02-03 ENCOUNTER — APPOINTMENT (OUTPATIENT)
Dept: CARDIOLOGY | Facility: CLINIC | Age: 76
End: 2021-02-03
Payer: MEDICARE

## 2021-02-03 PROCEDURE — 93306 TTE W/DOPPLER COMPLETE: CPT

## 2021-02-06 NOTE — ASSESSMENT
[FreeTextEntry1] : \par Outside blood work has been requested.  \par Follow-up blood work and echocardiogram.  \par Continue current medical therapy.  \par Increase aerobic exercise program \par \par Paroxysmal AF/NSVT/LBBB\par Continue Eliquis and beta blocker\par Bleeding precautions reviewed on high risk medication with Eliquis. \par \par Cardiomyopathy\par Continue Ace and beta blocker\par Follwo up echo\par \par TIA/PFO\par Continue Eliquis, ASA, and statin.\par \par VHD\par Follwo up echo\par Does not require SBE prophylaxis. \par \par PAD/Dyslipidemia/atherosclerosis\par Continue statin and ASA.\par

## 2021-02-06 NOTE — HISTORY OF PRESENT ILLNESS
[FreeTextEntry1] : ISSAC QUINONEZ  is a 75 year old  M\par PMH of parox AF s/p multiple DCCV, Multaq then RFA, NSVT, LBBB, mildly reduced EF without sx, VHD, remote TIA, PFO, TB s/p treatment, HTN, minor atherosclerosis, PAD, dyslipidemia. \par Prior open repair of iliac aneurysm. \par \par he works out part-time with a .  \par He does high intensity interval training.  \par He recently had blood work at the hospital.\par Today he denies chest pain, pressure, unusual shortness of breath, lightheadedness, dizziness, near syncope or syncope. \par Seen by vascular doctor.  Had prior repair.  Told contralateral side stable. \par \par EKG demonstrates sinus rhythm with a left bundle branch block \par ZIO monitor 2/24/20 sinus rhythm with LBBB, short SVT, difficult to r/o PAF on AC, min HR 48, max  and avg HR 68bpm. \par Echo 12/12/19: EF 55%, MVP, mod MR, mod AR, septal motion c/w LBBB. mild to mod TR, mild NM.\par MCOT 10/2019: Avg HR 71bpm, low HR 50bpm and high HR 147bpm. Short burst of SVT, longest being 17 beats in duration. 10 seconds of AT/AF\par Cardiac cath 10/2019: Normal Cors\par September 2019, hemoglobin 14.8, creatinine 1.0, LDL 74, total cholesterol 147 A1c 5.9. BMP 75, TSH 1.8. \par Abd u/s 9/18/19: No evidence of aortic aneurysm, mild plaque.\par Carotid Doppler 9/18/19, mild, nonobstructive disease, right thyroid cyst. Dedicated thyroid u/s 10/10/19 revealed 2 nodules <1.5 mm and a medial thyroid cyst. Results were discussed with patient and sent to PCP.\par MRI 5.17. [facility]. Mildly decreased LV systolic function. Normal RV systolic function. Normal atrial size. Mild aortic regurgitation moderate tricuspid regurgitation thoracic aorta 4.1 x 4.0 cm. No evidence of infarction inflammation or focal fibrosis. Ejection fraction 45%. Motion pattern changes of left under branch block. Moderate tricuspid regurgitation\par \par

## 2021-02-12 ENCOUNTER — NON-APPOINTMENT (OUTPATIENT)
Age: 76
End: 2021-02-12

## 2021-06-11 RX ORDER — LISINOPRIL 2.5 MG/1
2.5 TABLET ORAL
Qty: 90 | Refills: 2 | Status: DISCONTINUED | COMMUNITY
Start: 2019-09-19 | End: 2021-06-11

## 2021-07-23 ENCOUNTER — RX RENEWAL (OUTPATIENT)
Age: 76
End: 2021-07-23

## 2021-08-09 ENCOUNTER — APPOINTMENT (OUTPATIENT)
Dept: CARDIOLOGY | Facility: CLINIC | Age: 76
End: 2021-08-09

## 2021-08-17 ENCOUNTER — APPOINTMENT (OUTPATIENT)
Dept: CARDIOLOGY | Facility: CLINIC | Age: 76
End: 2021-08-17
Payer: MEDICARE

## 2021-08-17 VITALS
DIASTOLIC BLOOD PRESSURE: 50 MMHG | WEIGHT: 156 LBS | SYSTOLIC BLOOD PRESSURE: 98 MMHG | HEART RATE: 76 BPM | TEMPERATURE: 98 F | OXYGEN SATURATION: 98 % | HEIGHT: 68 IN | BODY MASS INDEX: 23.64 KG/M2

## 2021-08-17 DIAGNOSIS — I72.3 ANEURYSM OF ILIAC ARTERY: ICD-10-CM

## 2021-08-17 PROCEDURE — 99214 OFFICE O/P EST MOD 30 MIN: CPT

## 2021-08-17 NOTE — REASON FOR VISIT
[Hyperlipidemia] : hyperlipidemia [Carotid, Aortic and Peripheral Vascular Disease] : carotid, aortic and peripheral vascular disease [Arrhythmia/ECG Abnorrmalities] : arrhythmia/ECG abnormalities

## 2021-08-22 NOTE — ASSESSMENT
[FreeTextEntry1] : \par recent echocardiogram and blood work have been reviewed \par moderate mitral regurgitation.  Mild to moderate aortic regurgitation.  Mild aortic dilatation.  Possible PFO.  Improvement left ventricular function. \par may remain off ACE inhibitor \par follow-up with vascular \par continue anticoagulation statin and beta-blocker therapy \par monitor left ventricular function \par fasting blood work has been requested \par Lipitor has been refilled \par clinical follow-up will be scheduled in 6 months\par  \par Continue current medical therapy.  \par Increase aerobic exercise program \par \par Paroxysmal AF/NSVT/LBBB\par Continue Eliquis and beta blocker\par Bleeding precautions reviewed on high risk medication with Eliquis. \par \par Cardiomyopathy\par Continue beta blocker\par \par TIA/PFO\par Continue Eliquis, ASA, and statin.\par \par VHD\par Does not require SBE prophylaxis. \par \par PAD/Dyslipidemia/atherosclerosis\par Continue statin and ASA.\par

## 2021-08-22 NOTE — HISTORY OF PRESENT ILLNESS
[FreeTextEntry1] : ISSAC QUINONEZ  is a 76 year old  M\par \par PMH of parox AF s/p multiple DCCV, Multaq then RFA, NSVT, LBBB, mildly reduced EF without sx, VHD, remote TIA, PFO, TB s/p treatment, HTN, minor atherosclerosis, PAD, dyslipidemia. \par Prior open repair of iliac aneurysm. \par \par he works out part-time with a .  \par He does high intensity interval training.  \par \par Today he denies chest pain, pressure, unusual shortness of breath, lightheadedness, dizziness, near syncope or syncope. \par Seen by vasculr doctor.  Had prior repair.  Told contralateral side stable. \par \par he has been less active with his  \par there is minor bruising \par he was told findings stable at [facility] h\par e is off lisinopril due to a dry cough \par \par Echocardiogram February 2021 has normal left ventricular function moderate mitral regurgitation, mild to moderate aortic regurgitation ascending aorta 4.1 \par Last blood work with hemoglobin 14.7 creatinine 1.0 total cholesterol 161 LDL 77 \par \par EKG demonstrates sinus rhythm with a left bundle branch block \par ZIO monitor 2/24/20 sinus rhythm with LBBB, short SVT, difficult to r/o PAF on AC, min HR 48, max  and avg HR 68bpm. \par Cardiac cath 10/2019: Normal Cors\par Abd u/s 9/18/19: No evidence of aortic aneurysm, mild plaque.\par Carotid Doppler 9/18/19, mild, nonobstructive disease, right thyroid cyst. Dedicated thyroid u/s 10/10/19 revealed 2 nodules <1.5 mm and a medial thyroid cyst. Results were discussed with patient and sent to PCP.\par MRI 5.17. [facility]. Mildly decreased LV systolic function. Normal RV systolic function. Normal atrial size. Mild aortic regurgitation moderate tricuspid regurgitation thoracic aorta 4.1 x 4.0 cm. No evidence of infarction inflammation or focal fibrosis. Ejection fraction 45%. Motion pattern changes of left under branch block. Moderate tricuspid regurgitation\par

## 2021-10-29 ENCOUNTER — NON-APPOINTMENT (OUTPATIENT)
Age: 76
End: 2021-10-29

## 2021-11-08 RX ORDER — METOPROLOL SUCCINATE 25 MG/1
25 TABLET, EXTENDED RELEASE ORAL
Qty: 90 | Refills: 3 | Status: ACTIVE | COMMUNITY
Start: 2019-09-19 | End: 1900-01-01

## 2022-02-14 ENCOUNTER — NON-APPOINTMENT (OUTPATIENT)
Age: 77
End: 2022-02-14

## 2022-02-14 ENCOUNTER — APPOINTMENT (OUTPATIENT)
Dept: CARDIOLOGY | Facility: CLINIC | Age: 77
End: 2022-02-14
Payer: MEDICARE

## 2022-02-14 VITALS
HEIGHT: 68 IN | TEMPERATURE: 97.8 F | HEART RATE: 73 BPM | WEIGHT: 155 LBS | SYSTOLIC BLOOD PRESSURE: 102 MMHG | OXYGEN SATURATION: 98 % | DIASTOLIC BLOOD PRESSURE: 62 MMHG | BODY MASS INDEX: 23.49 KG/M2

## 2022-02-14 DIAGNOSIS — R79.89 OTHER SPECIFIED ABNORMAL FINDINGS OF BLOOD CHEMISTRY: ICD-10-CM

## 2022-02-14 DIAGNOSIS — R73.03 PREDIABETES.: ICD-10-CM

## 2022-02-14 PROCEDURE — 99214 OFFICE O/P EST MOD 30 MIN: CPT

## 2022-02-14 PROCEDURE — 93000 ELECTROCARDIOGRAM COMPLETE: CPT

## 2022-02-16 NOTE — HISTORY OF PRESENT ILLNESS
[FreeTextEntry1] : ISSAC QUINONEZ  is a 76 year old  M\par \par PMH of parox AF s/p multiple DCCV, Multaq then RFA, NSVT, LBBB, mildly reduced EF without sx, VHD, remote TIA, PFO, TB s/p treatment, HTN, minor atherosclerosis, PAD, dyslipidemia. \par Prior open repair of iliac aneurysm. \par \par he works out part-time with a .  \par He does high intensity interval training.  \par \par Today he denies chest pain, pressure, unusual shortness of breath, lightheadedness, dizziness, near syncope or syncope. \par Seen by vasculr doctor.  Had prior repair.  Told contralateral side stable. \par \par he has been less active with his  \par there is minor bruising \par he was told findings stable at [facility]\par off lisinopril due to a dry cough \par \par He had clinical follow-up at [facility].  At that time he was told his vascular findings were stable.  \par Blood work February 2022 hemoglobin 14.5 potassium 4.7 creatinine 1.0 A1c 6.1 total cholesterol 163 LDL 88 TSH 1.5 \par EKG demonstrates sinus rhythm with a left bundle branch block.  \par Echocardiogram February 2021 has normal left ventricular function moderate mitral regurgitation, mild to moderate aortic regurgitation ascending aorta 4.1 \par ZIO monitor 2/24/20 sinus rhythm with LBBB, short SVT, difficult to r/o PAF on AC, min HR 48, max  and avg HR 68bpm. \par Cardiac cath 10/2019: Normal Cors\par Abd u/s 9/18/19: No evidence of aortic aneurysm, mild plaque.\par Carotid Doppler 9/18/19, mild, nonobstructive disease, right thyroid cyst. Dedicated thyroid u/s 10/10/19 revealed 2 nodules <1.5 mm and a medial thyroid cyst. Results were discussed with patient and sent to PCP.\par MRI 5.17. [facility]. Mildly decreased LV systolic function. Normal RV systolic function. Normal atrial size. Mild aortic regurgitation moderate tricuspid regurgitation thoracic aorta 4.1 x 4.0 cm. No evidence of infarction inflammation or focal fibrosis. Ejection fraction 45%. Motion pattern changes of left under branch block. Moderate tricuspid regurgitation\par

## 2022-02-16 NOTE — ASSESSMENT
[FreeTextEntry1] : recent laboratory data and EKG have been reviewed \par follow-up echocardiogram to monitor valvular heart disease left ventricular function aortic dimensions.  \par Monitor applied.  \par Clinical follow-up will be scheduled afterwards.\par \par moderate mitral regurgitation.  Mild to moderate aortic regurgitation.  Mild aortic dilatation.  Possible PFO.  Improvement left ventricular function. \par off ACE inhibitor \par follow-up with vascular \par continue anticoagulation statin and beta-blocker therapy \par monitor left ventricular function \par Continue current medical therapy.  \par Increase aerobic exercise program \par \par Paroxysmal AF/NSVT/LBBB\par Continue Eliquis and beta blocker\par Bleeding precautions reviewed on high risk medication with Eliquis. \par \par Cardiomyopathy\par Continue beta blocker\par \par TIA/PFO\par Continue Eliquis, ASA, and statin.\par \par VHD\par Does not require SBE prophylaxis. \par \par PAD/Dyslipidemia/atherosclerosis\par Continue statin and ASA.

## 2022-02-18 RX ORDER — ATORVASTATIN CALCIUM 20 MG/1
20 TABLET, FILM COATED ORAL
Qty: 90 | Refills: 3 | Status: ACTIVE | COMMUNITY
Start: 1900-01-01 | End: 1900-01-01

## 2022-03-01 ENCOUNTER — APPOINTMENT (OUTPATIENT)
Dept: CARDIOLOGY | Facility: CLINIC | Age: 77
End: 2022-03-01
Payer: MEDICARE

## 2022-03-01 PROCEDURE — 93242 EXT ECG>48HR<7D RECORDING: CPT

## 2022-03-01 PROCEDURE — 93306 TTE W/DOPPLER COMPLETE: CPT

## 2022-03-07 ENCOUNTER — APPOINTMENT (OUTPATIENT)
Dept: CARDIOLOGY | Facility: CLINIC | Age: 77
End: 2022-03-07

## 2022-03-11 PROCEDURE — 93244 EXT ECG>48HR<7D REV&INTERPJ: CPT

## 2022-03-14 ENCOUNTER — APPOINTMENT (OUTPATIENT)
Dept: CARDIOLOGY | Facility: CLINIC | Age: 77
End: 2022-03-14
Payer: MEDICARE

## 2022-03-14 PROCEDURE — 93308 TTE F-UP OR LMTD: CPT

## 2022-04-08 ENCOUNTER — APPOINTMENT (OUTPATIENT)
Dept: CARDIOLOGY | Facility: CLINIC | Age: 77
End: 2022-04-08
Payer: MEDICARE

## 2022-04-08 VITALS
HEART RATE: 74 BPM | DIASTOLIC BLOOD PRESSURE: 56 MMHG | BODY MASS INDEX: 23.95 KG/M2 | SYSTOLIC BLOOD PRESSURE: 102 MMHG | HEIGHT: 68 IN | WEIGHT: 158 LBS | TEMPERATURE: 97.7 F | OXYGEN SATURATION: 98 %

## 2022-04-08 DIAGNOSIS — I71.2 THORACIC AORTIC ANEURYSM, W/OUT RUPTURE: ICD-10-CM

## 2022-04-08 DIAGNOSIS — I35.1 NONRHEUMATIC AORTIC (VALVE) INSUFFICIENCY: ICD-10-CM

## 2022-04-08 DIAGNOSIS — I48.91 UNSPECIFIED ATRIAL FIBRILLATION: ICD-10-CM

## 2022-04-08 DIAGNOSIS — I34.0 NONRHEUMATIC MITRAL (VALVE) INSUFFICIENCY: ICD-10-CM

## 2022-04-08 DIAGNOSIS — I42.9 CARDIOMYOPATHY, UNSPECIFIED: ICD-10-CM

## 2022-04-08 DIAGNOSIS — I44.7 LEFT BUNDLE-BRANCH BLOCK, UNSPECIFIED: ICD-10-CM

## 2022-04-08 DIAGNOSIS — Z79.01 LONG TERM (CURRENT) USE OF ANTICOAGULANTS: ICD-10-CM

## 2022-04-08 DIAGNOSIS — I49.3 VENTRICULAR PREMATURE DEPOLARIZATION: ICD-10-CM

## 2022-04-08 DIAGNOSIS — E78.00 PURE HYPERCHOLESTEROLEMIA, UNSPECIFIED: ICD-10-CM

## 2022-04-08 PROCEDURE — 99214 OFFICE O/P EST MOD 30 MIN: CPT

## 2022-04-08 NOTE — ASSESSMENT
[FreeTextEntry1] : ISSAC QUINONEZ is a 76 year old M who presents today Apr 08, 2022 with the above history and the following active issues: \par \par CMP\par Moderate mitral regurgitation.  Mild to moderate aortic regurgitation.  Mild aortic dilatation.  Possible PFO.  \par Stable VHD.\par Stable LVEF compared to MRI in 2017, 45%. \par No CHF noted. \par off ACE inhibitor (cough), tolerating beta blocker. \par Would not add further antihypertensive d/t low /56. \par Surveillance monitoring will be performed. \par Of note normal cors cath 2019 and highly active without exertional symptoms. \par continue anticoagulation statin and beta-blocker therapy \par Increase aerobic exercise program \par \par Paroxysmal AF/NSVT/LBBB\par Brief AT on monitoring. No AF. \par Continue Eliquis and beta blocker\par Bleeding precautions reviewed on high risk medication with Eliquis. \par \par TIA/PFO\par Continue Eliquis, ASA, and statin.\par \par PAD/Dyslipidemia/atherosclerosis\par Continue statin and ASA.\par follow-up with vascular \par \par F.U 6 mo unless otherwise indicated. \par Any questions and concerns were addressed and resolved. \par \par Sincerely,\par \par RAMILA Stevenson\par Patients history, testing, and plan reviewed with supervising MD: Dr. Clinton Bueno

## 2022-04-08 NOTE — ADDENDUM
[FreeTextEntry1] : Please note the patient was reviewed with NP Katie Fitzpatrick.\par I was physically present during the service of the patient.\par I was directly involved in the management plan and recommendations of the care provided to the patient. \par I personally reviewed the history and physical examination as documented by the NP above.\par Apr 8 2022  4:00PM\par

## 2022-04-08 NOTE — HISTORY OF PRESENT ILLNESS
[FreeTextEntry1] : ISSAC QUINONEZ  is a 76 year old  M\par \par PMH of parox AF s/p multiple DCCV, Multaq then RFA, NSVT, LBBB, mildly reduced EF without sx, VHD, remote TIA, PFO, TB s/p treatment, HTN, minor atherosclerosis, PAD, dyslipidemia. \par Prior open repair of iliac aneurysm. \par \par he works out part-time with a .  \par He does high intensity interval training.  \par \par Today he denies chest pain, pressure, unusual shortness of breath, lightheadedness, dizziness, near syncope or syncope. \par Seen by vascular doctor.  Had prior repair.  Told contralateral side stable. \par \par he has been less active with his  \par there is minor bruising \par he was told findings stable at [facility]\par off lisinopril due to a dry cough \par \par Echo March 2022 EF 45% moderate MR. Lumason not performed d/t PFO ( ? contraindication)\par 7 day monitor March 2022 normal sinus rhythm avg 72 bpm. Multiple runs of AT up to 13.7 sec. APCs 8%, PVCs 1%. \par \par He had clinical follow-up at [facility].  At that time he was told his vascular findings were stable.  \par Blood work February 2022 hemoglobin 14.5 potassium 4.7 creatinine 1.0 A1c 6.1 total cholesterol 163 LDL 88 TSH 1.5 \par EKG demonstrates sinus rhythm with a left bundle branch block.  \par Echocardiogram February 2021 has normal left ventricular function moderate mitral regurgitation, mild to moderate aortic regurgitation ascending aorta 4.1 \par ZIO monitor 2/24/20 sinus rhythm with LBBB, short SVT, difficult to r/o PAF on AC, min HR 48, max  and avg HR 68bpm. \par Cardiac cath 10/2019: Normal Cors\par Abd u/s 9/18/19: No evidence of aortic aneurysm, mild plaque.\par Carotid Doppler 9/18/19, mild, nonobstructive disease, right thyroid cyst. Dedicated thyroid u/s 10/10/19 revealed 2 nodules <1.5 mm and a medial thyroid cyst. Results were discussed with patient and sent to PCP.\par MRI 5.17. [facility]. Mildly decreased LV systolic function. Normal RV systolic function. Normal atrial size. Mild aortic regurgitation moderate tricuspid regurgitation thoracic aorta 4.1 x 4.0 cm. No evidence of infarction inflammation or focal fibrosis. Ejection fraction 45%. Motion pattern changes of left under branch block. Moderate tricuspid regurgitation\par

## 2022-10-02 NOTE — HISTORY OF PRESENT ILLNESS
[FreeTextEntry1] : ISSAC QUINONEZ  is a 77 year old  M\par \par \par PMH of parox AF s/p multiple DCCV, Multaq then RFA, NSVT, LBBB, mildly reduced EF without sx, VHD, remote TIA, PFO, TB s/p treatment, HTN, minor atherosclerosis, PAD, dyslipidemia. \par Prior open repair of iliac aneurysm. \par \par he works out part-time with a .  \par He does high intensity interval training.  \par \par Today he denies chest pain, pressure, unusual shortness of breath, lightheadedness, dizziness, near syncope or syncope. \par Seen by vascular doctor.  Had prior repair.  Told contralateral side stable. \par \par he has been less active with his  \par there is minor bruising \par he was told findings stable at [facility]\par off lisinopril due to a dry cough \par \par Echo March 2022 EF 45% moderate MR. Lumason not performed d/t PFO ( ? contraindication)\par 7 day monitor March 2022 normal sinus rhythm avg 72 bpm. Multiple runs of AT up to 13.7 sec. APCs 8%, PVCs 1%. \par \par He had clinical follow-up at [facility].  At that time he was told his vascular findings were stable.  \par Blood work February 2022 hemoglobin 14.5 potassium 4.7 creatinine 1.0 A1c 6.1 total cholesterol 163 LDL 88 TSH 1.5 \par EKG demonstrates sinus rhythm with a left bundle branch block.  \par Echocardiogram February 2021 has normal left ventricular function moderate mitral regurgitation, mild to moderate aortic regurgitation ascending aorta 4.1 \par ZIO monitor 2/24/20 sinus rhythm with LBBB, short SVT, difficult to r/o PAF on AC, min HR 48, max  and avg HR 68bpm. \par Cardiac cath 10/2019: Normal Cors\par Abd u/s 9/18/19: No evidence of aortic aneurysm, mild plaque.\par Carotid Doppler 9/18/19, mild, nonobstructive disease, right thyroid cyst. Dedicated thyroid u/s 10/10/19 revealed 2 nodules <1.5 mm and a medial thyroid cyst. Results were discussed with patient and sent to PCP.\par MRI 5.17. [facility]. Mildly decreased LV systolic function. Normal RV systolic function. Normal atrial size. Mild aortic regurgitation moderate tricuspid regurgitation thoracic aorta 4.1 x 4.0 cm. No evidence of infarction inflammation or focal fibrosis. Ejection fraction 45%. Motion pattern changes of left under branch block. Moderate tricuspid regurgitation\par \par \par CMP\par Moderate mitral regurgitation.  Mild to moderate aortic regurgitation.  Mild aortic dilatation.  Possible PFO.  \par Stable VHD.\par Stable LVEF compared to MRI in 2017, 45%. \par No CHF noted. \par off ACE inhibitor (cough), tolerating beta blocker. \par Would not add further antihypertensive d/t low /56. \par Surveillance monitoring will be performed. \par Of note normal cors cath 2019 and highly active without exertional symptoms. \par continue anticoagulation statin and beta-blocker therapy \par Increase aerobic exercise program \par \par Paroxysmal AF/NSVT/LBBB\par Brief AT on monitoring. No AF. \par Continue Eliquis and beta blocker\par Bleeding precautions reviewed on high risk medication with Eliquis. \par \par TIA/PFO\par Continue Eliquis, ASA, and statin.\par \par PAD/Dyslipidemia/atherosclerosis\par Continue statin and ASA.\par follow-up with vascular \par \par F.U 6 mo unless otherwise indicated. \par Any questions and concerns were addressed and resolved.

## 2022-10-04 ENCOUNTER — APPOINTMENT (OUTPATIENT)
Dept: CARDIOLOGY | Facility: CLINIC | Age: 77
End: 2022-10-04

## 2022-12-20 RX ORDER — APIXABAN 5 MG/1
5 TABLET, FILM COATED ORAL
Qty: 180 | Refills: 0 | Status: ACTIVE | COMMUNITY
Start: 1900-01-01 | End: 1900-01-01

## 2024-12-29 ENCOUNTER — NON-APPOINTMENT (OUTPATIENT)
Age: 79
End: 2024-12-29

## 2025-01-23 ENCOUNTER — NON-APPOINTMENT (OUTPATIENT)
Age: 80
End: 2025-01-23